# Patient Record
Sex: FEMALE | Race: WHITE | NOT HISPANIC OR LATINO | Employment: UNEMPLOYED | URBAN - METROPOLITAN AREA
[De-identification: names, ages, dates, MRNs, and addresses within clinical notes are randomized per-mention and may not be internally consistent; named-entity substitution may affect disease eponyms.]

---

## 2018-04-20 ENCOUNTER — TRANSCRIBE ORDERS (OUTPATIENT)
Dept: WOMENS IMAGING | Facility: HOSPITAL | Age: 28
End: 2018-04-20

## 2018-04-20 DIAGNOSIS — Z36.9 FIRST TRIMESTER SCREENING: Primary | ICD-10-CM

## 2018-04-20 DIAGNOSIS — IMO0002 NUCHAL FOLD THICKENING DETERMINED BY ULTRASOUND: ICD-10-CM

## 2018-05-03 ENCOUNTER — APPOINTMENT (OUTPATIENT)
Dept: WOMENS IMAGING | Facility: HOSPITAL | Age: 28
End: 2018-05-03

## 2018-05-09 ENCOUNTER — APPOINTMENT (OUTPATIENT)
Dept: WOMENS IMAGING | Facility: HOSPITAL | Age: 28
End: 2018-05-09

## 2018-06-08 ENCOUNTER — TRANSCRIBE ORDERS (OUTPATIENT)
Dept: WOMENS IMAGING | Facility: HOSPITAL | Age: 28
End: 2018-06-08

## 2018-06-08 DIAGNOSIS — O28.3 ABNORMAL FETAL ULTRASOUND: Primary | ICD-10-CM

## 2018-06-08 DIAGNOSIS — Z36.82 NUCHAL TRANSLUCENCY OF FETUS ON PRENATAL ULTRASOUND: ICD-10-CM

## 2018-06-18 ENCOUNTER — APPOINTMENT (OUTPATIENT)
Dept: WOMENS IMAGING | Facility: HOSPITAL | Age: 28
End: 2018-06-18

## 2020-12-08 ENCOUNTER — TELEPHONE (OUTPATIENT)
Dept: PSYCHIATRY | Facility: HOSPITAL | Age: 30
End: 2020-12-08

## 2022-03-11 ENCOUNTER — TRANSCRIBE ORDERS (OUTPATIENT)
Dept: ADMINISTRATIVE | Facility: HOSPITAL | Age: 32
End: 2022-03-11

## 2022-03-11 DIAGNOSIS — R60.9 PERIPHERAL EDEMA: Primary | ICD-10-CM

## 2023-03-17 LAB
EXTERNAL GROUP B STREP ANTIGEN: NORMAL
EXTERNAL HEPATITIS B SURFACE ANTIGEN: NEGATIVE
EXTERNAL RUBELLA QUALITATIVE: NORMAL

## 2023-03-31 ENCOUNTER — APPOINTMENT (OUTPATIENT)
Dept: GENERAL RADIOLOGY | Facility: HOSPITAL | Age: 33
End: 2023-03-31
Payer: COMMERCIAL

## 2023-03-31 ENCOUNTER — APPOINTMENT (OUTPATIENT)
Dept: ULTRASOUND IMAGING | Facility: HOSPITAL | Age: 33
End: 2023-03-31
Payer: COMMERCIAL

## 2023-03-31 ENCOUNTER — HOSPITAL ENCOUNTER (EMERGENCY)
Facility: HOSPITAL | Age: 33
Discharge: HOME | End: 2023-03-31
Attending: EMERGENCY MEDICINE | Admitting: EMERGENCY MEDICINE
Payer: COMMERCIAL

## 2023-03-31 VITALS
TEMPERATURE: 97.1 F | DIASTOLIC BLOOD PRESSURE: 75 MMHG | HEART RATE: 106 BPM | OXYGEN SATURATION: 98 % | SYSTOLIC BLOOD PRESSURE: 122 MMHG | RESPIRATION RATE: 18 BRPM | BODY MASS INDEX: 30.83 KG/M2 | HEIGHT: 63 IN | WEIGHT: 174 LBS

## 2023-03-31 DIAGNOSIS — S80.211A ABRASION OF RIGHT KNEE, INITIAL ENCOUNTER: ICD-10-CM

## 2023-03-31 DIAGNOSIS — S62.102A CLOSED FRACTURE OF LEFT WRIST, INITIAL ENCOUNTER: ICD-10-CM

## 2023-03-31 DIAGNOSIS — S80.212A ABRASION OF LEFT KNEE, INITIAL ENCOUNTER: ICD-10-CM

## 2023-03-31 DIAGNOSIS — S42.91XA CLOSED FRACTURE OF RIGHT SHOULDER, INITIAL ENCOUNTER: Primary | ICD-10-CM

## 2023-03-31 PROCEDURE — 76817 TRANSVAGINAL US OBSTETRIC: CPT

## 2023-03-31 PROCEDURE — 73030 X-RAY EXAM OF SHOULDER: CPT

## 2023-03-31 PROCEDURE — 73110 X-RAY EXAM OF WRIST: CPT

## 2023-03-31 PROCEDURE — 73110 X-RAY EXAM OF WRIST: CPT | Performed by: RADIOLOGY

## 2023-03-31 PROCEDURE — 73564 X-RAY EXAM KNEE 4 OR MORE: CPT | Performed by: RADIOLOGY

## 2023-03-31 PROCEDURE — 76817 TRANSVAGINAL US OBSTETRIC: CPT | Performed by: RADIOLOGY

## 2023-03-31 PROCEDURE — 73564 X-RAY EXAM KNEE 4 OR MORE: CPT

## 2023-03-31 PROCEDURE — 99282 EMERGENCY DEPT VISIT SF MDM: CPT

## 2023-03-31 PROCEDURE — 73030 X-RAY EXAM OF SHOULDER: CPT | Performed by: RADIOLOGY
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
## 2023-03-31 NOTE — ED NOTES
Pt arrived in cock-up wrist splint and shoulder sling that was placed by [facility] yesterday.

## 2023-04-03 ENCOUNTER — HOSPITAL ENCOUNTER (EMERGENCY)
Facility: HOSPITAL | Age: 33
Discharge: LEFT BEFORE DISCHARGE | End: 2023-04-03
Attending: STUDENT IN AN ORGANIZED HEALTH CARE EDUCATION/TRAINING PROGRAM | Admitting: STUDENT IN AN ORGANIZED HEALTH CARE EDUCATION/TRAINING PROGRAM
Payer: COMMERCIAL

## 2023-04-03 VITALS
SYSTOLIC BLOOD PRESSURE: 139 MMHG | BODY MASS INDEX: 30.83 KG/M2 | HEART RATE: 100 BPM | DIASTOLIC BLOOD PRESSURE: 85 MMHG | WEIGHT: 174 LBS | OXYGEN SATURATION: 94 % | RESPIRATION RATE: 18 BRPM | HEIGHT: 63 IN | TEMPERATURE: 99.1 F

## 2023-04-03 DIAGNOSIS — R07.9 CHEST PAIN, UNSPECIFIED TYPE: Primary | ICD-10-CM

## 2023-04-03 DIAGNOSIS — F19.10 SUBSTANCE ABUSE: ICD-10-CM

## 2023-04-03 LAB
ALBUMIN SERPL-MCNC: 3.3 G/DL (ref 3.5–5.2)
ALBUMIN/GLOB SERPL: 0.9 G/DL
ALP SERPL-CCNC: 54 U/L (ref 39–117)
ALT SERPL W P-5'-P-CCNC: 17 U/L (ref 1–33)
AMPHET+METHAMPHET UR QL: NEGATIVE
AMPHETAMINES UR QL: NEGATIVE
ANION GAP SERPL CALCULATED.3IONS-SCNC: 12.5 MMOL/L (ref 5–15)
AST SERPL-CCNC: 20 U/L (ref 1–32)
BACTERIA UR QL AUTO: ABNORMAL /HPF
BARBITURATES UR QL SCN: NEGATIVE
BASOPHILS # BLD AUTO: 0.03 10*3/MM3 (ref 0–0.2)
BASOPHILS NFR BLD AUTO: 0.3 % (ref 0–1.5)
BENZODIAZ UR QL SCN: NEGATIVE
BILIRUB SERPL-MCNC: 0.3 MG/DL (ref 0–1.2)
BILIRUB UR QL STRIP: NEGATIVE
BUN SERPL-MCNC: 6 MG/DL (ref 6–20)
BUN/CREAT SERPL: 10.3 (ref 7–25)
BUPRENORPHINE SERPL-MCNC: POSITIVE NG/ML
CALCIUM SPEC-SCNC: 9.4 MG/DL (ref 8.6–10.5)
CANNABINOIDS SERPL QL: POSITIVE
CHLORIDE SERPL-SCNC: 103 MMOL/L (ref 98–107)
CLARITY UR: CLEAR
CO2 SERPL-SCNC: 19.5 MMOL/L (ref 22–29)
COCAINE UR QL: NEGATIVE
COLOR UR: YELLOW
CREAT SERPL-MCNC: 0.58 MG/DL (ref 0.57–1)
CRP SERPL-MCNC: 2.69 MG/DL (ref 0–0.5)
DEPRECATED RDW RBC AUTO: 41.6 FL (ref 37–54)
EGFRCR SERPLBLD CKD-EPI 2021: 122 ML/MIN/1.73
EOSINOPHIL # BLD AUTO: 0.06 10*3/MM3 (ref 0–0.4)
EOSINOPHIL NFR BLD AUTO: 0.6 % (ref 0.3–6.2)
ERYTHROCYTE [DISTWIDTH] IN BLOOD BY AUTOMATED COUNT: 12.6 % (ref 12.3–15.4)
GLOBULIN UR ELPH-MCNC: 3.7 GM/DL
GLUCOSE SERPL-MCNC: 123 MG/DL (ref 65–99)
GLUCOSE UR STRIP-MCNC: NEGATIVE MG/DL
HCG INTACT+B SERPL-ACNC: NORMAL MIU/ML
HCT VFR BLD AUTO: 34.1 % (ref 34–46.6)
HGB BLD-MCNC: 11.6 G/DL (ref 12–15.9)
HGB UR QL STRIP.AUTO: NEGATIVE
HYALINE CASTS UR QL AUTO: ABNORMAL /LPF
IMM GRANULOCYTES # BLD AUTO: 0.03 10*3/MM3 (ref 0–0.05)
IMM GRANULOCYTES NFR BLD AUTO: 0.3 % (ref 0–0.5)
KETONES UR QL STRIP: ABNORMAL
LEUKOCYTE ESTERASE UR QL STRIP.AUTO: NEGATIVE
LYMPHOCYTES # BLD AUTO: 1.96 10*3/MM3 (ref 0.7–3.1)
LYMPHOCYTES NFR BLD AUTO: 20.7 % (ref 19.6–45.3)
MAGNESIUM SERPL-MCNC: 1.5 MG/DL (ref 1.6–2.6)
MCH RBC QN AUTO: 30.4 PG (ref 26.6–33)
MCHC RBC AUTO-ENTMCNC: 34 G/DL (ref 31.5–35.7)
MCV RBC AUTO: 89.5 FL (ref 79–97)
METHADONE UR QL SCN: NEGATIVE
MONOCYTES # BLD AUTO: 0.51 10*3/MM3 (ref 0.1–0.9)
MONOCYTES NFR BLD AUTO: 5.4 % (ref 5–12)
NEUTROPHILS NFR BLD AUTO: 6.87 10*3/MM3 (ref 1.7–7)
NEUTROPHILS NFR BLD AUTO: 72.7 % (ref 42.7–76)
NITRITE UR QL STRIP: NEGATIVE
NRBC BLD AUTO-RTO: 0 /100 WBC (ref 0–0.2)
OPIATES UR QL: POSITIVE
OXYCODONE UR QL SCN: POSITIVE
PCP UR QL SCN: NEGATIVE
PH UR STRIP.AUTO: 8 [PH] (ref 5–8)
PLATELET # BLD AUTO: 175 10*3/MM3 (ref 140–450)
PMV BLD AUTO: 11.6 FL (ref 6–12)
POTASSIUM SERPL-SCNC: 3.8 MMOL/L (ref 3.5–5.2)
PROPOXYPH UR QL: NEGATIVE
PROT SERPL-MCNC: 7 G/DL (ref 6–8.5)
PROT UR QL STRIP: ABNORMAL
RBC # BLD AUTO: 3.81 10*6/MM3 (ref 3.77–5.28)
RBC # UR STRIP: ABNORMAL /HPF
REF LAB TEST METHOD: ABNORMAL
SODIUM SERPL-SCNC: 135 MMOL/L (ref 136–145)
SP GR UR STRIP: 1.01 (ref 1–1.03)
SQUAMOUS #/AREA URNS HPF: ABNORMAL /HPF
TRICYCLICS UR QL SCN: NEGATIVE
TROPONIN T SERPL HS-MCNC: <6 NG/L
UROBILINOGEN UR QL STRIP: ABNORMAL
WBC # UR STRIP: ABNORMAL /HPF
WBC NRBC COR # BLD: 9.46 10*3/MM3 (ref 3.4–10.8)

## 2023-04-03 PROCEDURE — 80053 COMPREHEN METABOLIC PANEL: CPT | Performed by: PHYSICIAN ASSISTANT

## 2023-04-03 PROCEDURE — 86140 C-REACTIVE PROTEIN: CPT | Performed by: PHYSICIAN ASSISTANT

## 2023-04-03 PROCEDURE — 84484 ASSAY OF TROPONIN QUANT: CPT | Performed by: PHYSICIAN ASSISTANT

## 2023-04-03 PROCEDURE — 83735 ASSAY OF MAGNESIUM: CPT | Performed by: PHYSICIAN ASSISTANT

## 2023-04-03 PROCEDURE — 81001 URINALYSIS AUTO W/SCOPE: CPT | Performed by: PHYSICIAN ASSISTANT

## 2023-04-03 PROCEDURE — 93010 ELECTROCARDIOGRAM REPORT: CPT | Performed by: SPECIALIST

## 2023-04-03 PROCEDURE — 36415 COLL VENOUS BLD VENIPUNCTURE: CPT

## 2023-04-03 PROCEDURE — 84702 CHORIONIC GONADOTROPIN TEST: CPT | Performed by: PHYSICIAN ASSISTANT

## 2023-04-03 PROCEDURE — 80306 DRUG TEST PRSMV INSTRMNT: CPT | Performed by: PHYSICIAN ASSISTANT

## 2023-04-03 PROCEDURE — 85025 COMPLETE CBC W/AUTO DIFF WBC: CPT | Performed by: PHYSICIAN ASSISTANT

## 2023-04-03 PROCEDURE — 63710000001 ONDANSETRON ODT 4 MG TABLET DISPERSIBLE: Performed by: PHYSICIAN ASSISTANT

## 2023-04-03 PROCEDURE — 93005 ELECTROCARDIOGRAM TRACING: CPT | Performed by: STUDENT IN AN ORGANIZED HEALTH CARE EDUCATION/TRAINING PROGRAM

## 2023-04-03 PROCEDURE — 99284 EMERGENCY DEPT VISIT MOD MDM: CPT

## 2023-04-03 RX ORDER — ACETAMINOPHEN 500 MG
1000 TABLET ORAL ONCE
Status: COMPLETED | OUTPATIENT
Start: 2023-04-03 | End: 2023-04-03

## 2023-04-03 RX ORDER — SODIUM CHLORIDE 0.9 % (FLUSH) 0.9 %
10 SYRINGE (ML) INJECTION AS NEEDED
Status: DISCONTINUED | OUTPATIENT
Start: 2023-04-03 | End: 2023-04-03 | Stop reason: HOSPADM

## 2023-04-03 RX ORDER — ONDANSETRON 4 MG/1
4 TABLET, ORALLY DISINTEGRATING ORAL ONCE
Status: COMPLETED | OUTPATIENT
Start: 2023-04-03 | End: 2023-04-03

## 2023-04-03 RX ORDER — ONDANSETRON 2 MG/ML
4 INJECTION INTRAMUSCULAR; INTRAVENOUS ONCE
Status: DISCONTINUED | OUTPATIENT
Start: 2023-04-03 | End: 2023-04-03 | Stop reason: HOSPADM

## 2023-04-03 RX ADMIN — ACETAMINOPHEN 1000 MG: 500 TABLET ORAL at 07:34

## 2023-04-03 RX ADMIN — ONDANSETRON 4 MG: 4 TABLET, ORALLY DISINTEGRATING ORAL at 07:25

## 2023-04-03 RX ADMIN — MAGNESIUM GLUCONATE 500 MG ORAL TABLET 400 MG: 500 TABLET ORAL at 08:40

## 2023-04-03 NOTE — ED NOTES
Went to do hourly rounding on patient, she is not in her room. Checked both bathrooms and surrounding areas, unable to locate patient. It appears that patient has eloped. Patient did not have an IV.

## 2023-04-03 NOTE — ED PROVIDER NOTES
Subjective     History provided by:  Patient  Chest Pain  Pain location:  Substernal area and epigastric  Pain quality: aching    Pain severity:  Moderate  Onset quality:  Sudden  Duration:  1 day  Timing:  Constant  Progression:  Worsening  Chronicity:  New  Relieved by:  Nothing  Associated symptoms: abdominal pain    Associated symptoms: no fever        Review of Systems   Constitutional: Negative.  Negative for fever.   HENT: Negative.    Respiratory: Negative.    Cardiovascular: Positive for chest pain.   Gastrointestinal: Positive for abdominal pain.   Endocrine: Negative.    Genitourinary: Negative.  Negative for dysuria.   Skin: Negative.    Neurological: Negative.    Psychiatric/Behavioral: Negative.    All other systems reviewed and are negative.      No past medical history on file.    Allergies   Allergen Reactions   • Codeine Unknown - Low Severity   • Penicillins Swelling       No past surgical history on file.    No family history on file.    Social History     Socioeconomic History   • Marital status:            Objective   Physical Exam  Vitals and nursing note reviewed.   Constitutional:       General: She is not in acute distress.     Appearance: She is well-developed. She is not diaphoretic.   HENT:      Head: Normocephalic and atraumatic.      Right Ear: External ear normal.      Left Ear: External ear normal.      Nose: Nose normal.   Eyes:      Conjunctiva/sclera: Conjunctivae normal.      Pupils: Pupils are equal, round, and reactive to light.   Neck:      Vascular: No JVD.      Trachea: No tracheal deviation.   Cardiovascular:      Rate and Rhythm: Normal rate and regular rhythm.      Heart sounds: Normal heart sounds. No murmur heard.  Pulmonary:      Effort: Pulmonary effort is normal. No respiratory distress.      Breath sounds: No wheezing.   Abdominal:      Palpations: Abdomen is soft.      Tenderness: There is abdominal tenderness (epigastric).   Musculoskeletal:          General: No deformity. Normal range of motion.      Cervical back: Normal range of motion and neck supple.   Skin:     General: Skin is warm and dry.      Coloration: Skin is not pale.      Findings: No erythema or rash.   Neurological:      Mental Status: She is alert and oriented to person, place, and time.      Cranial Nerves: No cranial nerve deficit.   Psychiatric:         Behavior: Behavior normal.         Thought Content: Thought content normal.         Procedures           ED Course  ED Course as of 04/03/23 1014   Mon Apr 03, 2023   0656 ECG sinus tachycardia 101 HR. . . ST abnormality in V2 and V3, likely artifact. No ST elevation, reciprocal changes. NO STEMI criteria. Electronically signed by Vaishnavi Cheng MD, 04/03/23, 6:57 AM EDT.  [LS]   0915 Patient eloped from the ED.  She did not notify staff that she was leaving. [MB]      ED Course User Index  [LS] Vaishnavi Cheng MD  [MB] Taylor Be APRN                                           [facility]    Final diagnoses:   Chest pain, unspecified type   Substance abuse       ED Disposition  ED Disposition     ED Disposition   Eloped    Condition   --    Comment   --             No follow-up provider specified.       Medication List      No changes were made to your prescriptions during this visit.          Taylor Be APRN  04/03/23 1014

## 2023-04-03 NOTE — ED NOTES
Went to obtain IV access on patient, she reports that she is a former IV drug user and has not veins. Requesting to have medication changed to PO, provider aware.

## 2023-04-05 LAB
QT INTERVAL: 322 MS
QTC INTERVAL: 417 MS

## 2023-04-11 LAB
EXTERNAL SYPHILIS RPR SCREEN: NORMAL
HIV1 P24 AG SERPL QL IA: NORMAL

## 2023-08-02 ENCOUNTER — PATIENT OUTREACH (OUTPATIENT)
Dept: LABOR AND DELIVERY | Facility: HOSPITAL | Age: 33
End: 2023-08-02
Payer: COMMERCIAL

## 2023-08-02 ENCOUNTER — PATIENT MESSAGE (OUTPATIENT)
Dept: LABOR AND DELIVERY | Facility: HOSPITAL | Age: 33
End: 2023-08-02
Payer: COMMERCIAL

## 2023-09-07 ENCOUNTER — PATIENT MESSAGE (OUTPATIENT)
Dept: LABOR AND DELIVERY | Facility: HOSPITAL | Age: 33
End: 2023-09-07
Payer: COMMERCIAL

## 2023-09-07 ENCOUNTER — PATIENT OUTREACH (OUTPATIENT)
Dept: LABOR AND DELIVERY | Facility: HOSPITAL | Age: 33
End: 2023-09-07
Payer: COMMERCIAL

## 2023-09-07 NOTE — OUTREACH NOTE
[facility]  Check-In    Current Estimated Gestational Age: 33w2d      Questions/Answers      Flowsheet Row Responses   Best Method for Contacting Home   Demographics Reviewed Yes   Currently Employed No   Able to keep appointments as scheduled Yes   Baby Active/Feeling Fetal Movemen Yes   Questions regarding prenatal visits or tests to be ordered? No   Education related to new diagnoses/home equipment No   May I ask you questions about your substance use? Yes   Other Comment Pt is in MAT program at [facility].   Special Considerations Birthing Ball   Supplies ready for baby Breast Pump, Car Seat, Clothing, Crib, Diapers, Feeding Supplies   Resource/Environmental Concerns None   Do you have any questions related to your care experience, your pregnancy, plans for delivery, any concerns, etc? No          Pt returned earlier call. Pt reports her pregnancy is going well. She denies any needs or concerns at this time.    Referral submitted to the following resources (verbal consent received to submit demographic information):         Elvira Sen RN  Maternity Nurse Navigator    9/7/2023, 16:08 EDT

## 2023-09-07 NOTE — OUTREACH NOTE
[facility]  Unable to Reach       Questions/Answers      Flowsheet Row Responses   Pending Outreach Prenatal Check-in   Call Attempt Second   Outcome Left message            Left message with call back number. Information and Depression questionnaire sent by Sharda.      Elvira Sen RN  Maternity Nurse Navigator    9/7/2023, 14:53 EDT

## 2023-09-27 ENCOUNTER — ANESTHESIA (OUTPATIENT)
Dept: LABOR AND DELIVERY | Facility: HOSPITAL | Age: 33
End: 2023-09-27
Payer: COMMERCIAL

## 2023-09-27 ENCOUNTER — ANESTHESIA EVENT (OUTPATIENT)
Dept: LABOR AND DELIVERY | Facility: HOSPITAL | Age: 33
End: 2023-09-27
Payer: COMMERCIAL

## 2023-09-27 ENCOUNTER — HOSPITAL ENCOUNTER (INPATIENT)
Facility: HOSPITAL | Age: 33
LOS: 2 days | Discharge: HOME | End: 2023-09-29
Attending: OBSTETRICS & GYNECOLOGY | Admitting: OBSTETRICS & GYNECOLOGY
Payer: COMMERCIAL

## 2023-09-27 PROBLEM — Z34.90 PREGNANCY: Status: ACTIVE | Noted: 2023-09-27

## 2023-09-27 LAB
A1 MICROGLOB PLACENTAL VAG QL: POSITIVE
ABO GROUP BLD: NORMAL
ABO GROUP BLD: NORMAL
ALBUMIN SERPL-MCNC: 2.8 G/DL (ref 3.5–5.2)
ALBUMIN/GLOB SERPL: 0.8 G/DL
ALP SERPL-CCNC: 104 U/L (ref 39–117)
ALT SERPL W P-5'-P-CCNC: 10 U/L (ref 1–33)
AMPHET+METHAMPHET UR QL: NEGATIVE
AMPHETAMINES UR QL: NEGATIVE
ANION GAP SERPL CALCULATED.3IONS-SCNC: 8.6 MMOL/L (ref 5–15)
AST SERPL-CCNC: 15 U/L (ref 1–32)
BARBITURATES UR QL SCN: NEGATIVE
BENZODIAZ UR QL SCN: NEGATIVE
BILIRUB SERPL-MCNC: 0.2 MG/DL (ref 0–1.2)
BLD GP AB SCN SERPL QL: NEGATIVE
BUN SERPL-MCNC: 4 MG/DL (ref 6–20)
BUN/CREAT SERPL: 6.3 (ref 7–25)
BUPRENORPHINE SERPL-MCNC: POSITIVE NG/ML
CALCIUM SPEC-SCNC: 9.1 MG/DL (ref 8.6–10.5)
CANNABINOIDS SERPL QL: NEGATIVE
CHLORIDE SERPL-SCNC: 104 MMOL/L (ref 98–107)
CO2 SERPL-SCNC: 23.4 MMOL/L (ref 22–29)
COCAINE UR QL: NEGATIVE
CREAT SERPL-MCNC: 0.64 MG/DL (ref 0.57–1)
DEPRECATED RDW RBC AUTO: 47.1 FL (ref 37–54)
EGFRCR SERPLBLD CKD-EPI 2021: 119.1 ML/MIN/1.73
ERYTHROCYTE [DISTWIDTH] IN BLOOD BY AUTOMATED COUNT: 14 % (ref 12.3–15.4)
FENTANYL UR-MCNC: NEGATIVE NG/ML
GLOBULIN UR ELPH-MCNC: 3.5 GM/DL
GLUCOSE BLDC GLUCOMTR-MCNC: 211 MG/DL (ref 70–130)
GLUCOSE SERPL-MCNC: 117 MG/DL (ref 65–99)
HCT VFR BLD AUTO: 33.4 % (ref 34–46.6)
HGB BLD-MCNC: 11.1 G/DL (ref 12–15.9)
MCH RBC QN AUTO: 31.1 PG (ref 26.6–33)
MCHC RBC AUTO-ENTMCNC: 33.2 G/DL (ref 31.5–35.7)
MCV RBC AUTO: 93.6 FL (ref 79–97)
METHADONE UR QL SCN: NEGATIVE
OPIATES UR QL: NEGATIVE
OXYCODONE UR QL SCN: NEGATIVE
PCP UR QL SCN: NEGATIVE
PLATELET # BLD AUTO: 189 10*3/MM3 (ref 140–450)
PMV BLD AUTO: 10.2 FL (ref 6–12)
POTASSIUM SERPL-SCNC: 3.9 MMOL/L (ref 3.5–5.2)
PROPOXYPH UR QL: NEGATIVE
PROT SERPL-MCNC: 6.3 G/DL (ref 6–8.5)
RBC # BLD AUTO: 3.57 10*6/MM3 (ref 3.77–5.28)
RH BLD: POSITIVE
RH BLD: POSITIVE
SODIUM SERPL-SCNC: 136 MMOL/L (ref 136–145)
T&S EXPIRATION DATE: NORMAL
TRICYCLICS UR QL SCN: NEGATIVE
WBC NRBC COR # BLD: 12.89 10*3/MM3 (ref 3.4–10.8)

## 2023-09-27 PROCEDURE — 25010000002 BETAMETHASONE ACET & SOD PHOS PER 4 MG: Performed by: OBSTETRICS & GYNECOLOGY

## 2023-09-27 PROCEDURE — 25010000002 AZITHROMYCIN PER 500 MG: Performed by: OBSTETRICS & GYNECOLOGY

## 2023-09-27 PROCEDURE — 86850 RBC ANTIBODY SCREEN: CPT | Performed by: OBSTETRICS & GYNECOLOGY

## 2023-09-27 PROCEDURE — 25010000002 AMPICILLIN PER 500 MG: Performed by: OBSTETRICS & GYNECOLOGY

## 2023-09-27 PROCEDURE — 86900 BLOOD TYPING SEROLOGIC ABO: CPT | Performed by: OBSTETRICS & GYNECOLOGY

## 2023-09-27 PROCEDURE — 86901 BLOOD TYPING SEROLOGIC RH(D): CPT | Performed by: OBSTETRICS & GYNECOLOGY

## 2023-09-27 PROCEDURE — 85027 COMPLETE CBC AUTOMATED: CPT | Performed by: OBSTETRICS & GYNECOLOGY

## 2023-09-27 PROCEDURE — 80307 DRUG TEST PRSMV CHEM ANLYZR: CPT | Performed by: OBSTETRICS & GYNECOLOGY

## 2023-09-27 PROCEDURE — C1755 CATHETER, INTRASPINAL: HCPCS | Performed by: NURSE ANESTHETIST, CERTIFIED REGISTERED

## 2023-09-27 PROCEDURE — 36415 COLL VENOUS BLD VENIPUNCTURE: CPT | Performed by: OBSTETRICS & GYNECOLOGY

## 2023-09-27 PROCEDURE — 84112 EVAL AMNIOTIC FLUID PROTEIN: CPT | Performed by: OBSTETRICS & GYNECOLOGY

## 2023-09-27 PROCEDURE — 80053 COMPREHEN METABOLIC PANEL: CPT | Performed by: OBSTETRICS & GYNECOLOGY

## 2023-09-27 PROCEDURE — 82948 REAGENT STRIP/BLOOD GLUCOSE: CPT

## 2023-09-27 PROCEDURE — 86900 BLOOD TYPING SEROLOGIC ABO: CPT

## 2023-09-27 PROCEDURE — 25010000002 FENTANYL CITRATE (PF) 50 MCG/ML SOLUTION: Performed by: NURSE ANESTHETIST, CERTIFIED REGISTERED

## 2023-09-27 PROCEDURE — 86901 BLOOD TYPING SEROLOGIC RH(D): CPT

## 2023-09-27 PROCEDURE — 0 LIDOCAINE 1 % SOLUTION: Performed by: NURSE ANESTHETIST, CERTIFIED REGISTERED

## 2023-09-27 RX ORDER — OXYTOCIN/0.9 % SODIUM CHLORIDE 30/500 ML
PLASTIC BAG, INJECTION (ML) INTRAVENOUS
Status: COMPLETED
Start: 2023-09-27 | End: 2023-09-27

## 2023-09-27 RX ORDER — ACETAMINOPHEN 325 MG/1
650 TABLET ORAL EVERY 4 HOURS PRN
Status: DISCONTINUED | OUTPATIENT
Start: 2023-09-27 | End: 2023-09-28

## 2023-09-27 RX ORDER — PRENATAL VIT/IRON FUM/FOLIC AC 27MG-0.8MG
1 TABLET ORAL DAILY
Status: DISCONTINUED | OUTPATIENT
Start: 2023-09-28 | End: 2023-09-29 | Stop reason: HOSPADM

## 2023-09-27 RX ORDER — SODIUM CHLORIDE, SODIUM LACTATE, POTASSIUM CHLORIDE, CALCIUM CHLORIDE 600; 310; 30; 20 MG/100ML; MG/100ML; MG/100ML; MG/100ML
125 INJECTION, SOLUTION INTRAVENOUS CONTINUOUS
Status: DISCONTINUED | OUTPATIENT
Start: 2023-09-27 | End: 2023-09-29 | Stop reason: HOSPADM

## 2023-09-27 RX ORDER — OXYTOCIN/0.9 % SODIUM CHLORIDE 30/500 ML
250 PLASTIC BAG, INJECTION (ML) INTRAVENOUS CONTINUOUS
Status: ACTIVE | OUTPATIENT
Start: 2023-09-27 | End: 2023-09-27

## 2023-09-27 RX ORDER — LIDOCAINE HYDROCHLORIDE 10 MG/ML
0.5 INJECTION, SOLUTION INFILTRATION; PERINEURAL ONCE AS NEEDED
Status: DISCONTINUED | OUTPATIENT
Start: 2023-09-27 | End: 2023-09-28

## 2023-09-27 RX ORDER — ONDANSETRON 2 MG/ML
4 INJECTION INTRAMUSCULAR; INTRAVENOUS EVERY 6 HOURS PRN
Status: DISCONTINUED | OUTPATIENT
Start: 2023-09-27 | End: 2023-09-28

## 2023-09-27 RX ORDER — METOCLOPRAMIDE 10 MG/1
10 TABLET ORAL ONCE
Status: COMPLETED | OUTPATIENT
Start: 2023-09-28 | End: 2023-09-28

## 2023-09-27 RX ORDER — DIPHENHYDRAMINE HCL 25 MG
25 CAPSULE ORAL NIGHTLY PRN
Status: DISCONTINUED | OUTPATIENT
Start: 2023-09-27 | End: 2023-09-29 | Stop reason: HOSPADM

## 2023-09-27 RX ORDER — HYDROCORTISONE 25 MG/G
1 CREAM TOPICAL AS NEEDED
Status: DISCONTINUED | OUTPATIENT
Start: 2023-09-27 | End: 2023-09-29 | Stop reason: HOSPADM

## 2023-09-27 RX ORDER — ACETAMINOPHEN 325 MG/1
650 TABLET ORAL EVERY 6 HOURS PRN
Status: DISCONTINUED | OUTPATIENT
Start: 2023-09-27 | End: 2023-09-29 | Stop reason: HOSPADM

## 2023-09-27 RX ORDER — ONDANSETRON 2 MG/ML
4 INJECTION INTRAMUSCULAR; INTRAVENOUS EVERY 6 HOURS PRN
Status: DISCONTINUED | OUTPATIENT
Start: 2023-09-27 | End: 2023-09-29 | Stop reason: HOSPADM

## 2023-09-27 RX ORDER — ONDANSETRON 4 MG/1
4 TABLET, FILM COATED ORAL EVERY 6 HOURS PRN
Status: DISCONTINUED | OUTPATIENT
Start: 2023-09-27 | End: 2023-09-28

## 2023-09-27 RX ORDER — BUPRENORPHINE HYDROCHLORIDE AND NALOXONE HYDROCHLORIDE DIHYDRATE 8; 2 MG/1; MG/1
1 TABLET SUBLINGUAL DAILY
Status: ON HOLD | COMMUNITY
End: 2023-09-28

## 2023-09-27 RX ORDER — EPHEDRINE SULFATE 5 MG/ML
10 INJECTION INTRAVENOUS
Status: DISCONTINUED | OUTPATIENT
Start: 2023-09-27 | End: 2023-09-28

## 2023-09-27 RX ORDER — BETAMETHASONE SODIUM PHOSPHATE AND BETAMETHASONE ACETATE 3; 3 MG/ML; MG/ML
12 INJECTION, SUSPENSION INTRA-ARTICULAR; INTRALESIONAL; INTRAMUSCULAR; SOFT TISSUE ONCE
Status: COMPLETED | OUTPATIENT
Start: 2023-09-27 | End: 2023-09-27

## 2023-09-27 RX ORDER — ROPIVACAINE HYDROCHLORIDE 2 MG/ML
14 INJECTION, SOLUTION EPIDURAL; INFILTRATION; PERINEURAL CONTINUOUS
Status: DISCONTINUED | OUTPATIENT
Start: 2023-09-27 | End: 2023-09-29 | Stop reason: HOSPADM

## 2023-09-27 RX ORDER — SODIUM CHLORIDE 0.9 % (FLUSH) 0.9 %
10 SYRINGE (ML) INJECTION EVERY 12 HOURS SCHEDULED
Status: DISCONTINUED | OUTPATIENT
Start: 2023-09-27 | End: 2023-09-28

## 2023-09-27 RX ORDER — OXYTOCIN/0.9 % SODIUM CHLORIDE 30/500 ML
125 PLASTIC BAG, INJECTION (ML) INTRAVENOUS CONTINUOUS PRN
Status: DISCONTINUED | OUTPATIENT
Start: 2023-09-27 | End: 2023-09-29 | Stop reason: HOSPADM

## 2023-09-27 RX ORDER — ONDANSETRON 4 MG/1
4 TABLET, FILM COATED ORAL EVERY 6 HOURS PRN
Status: DISCONTINUED | OUTPATIENT
Start: 2023-09-27 | End: 2023-09-29 | Stop reason: HOSPADM

## 2023-09-27 RX ORDER — BISACODYL 10 MG
10 SUPPOSITORY, RECTAL RECTAL DAILY PRN
Status: DISCONTINUED | OUTPATIENT
Start: 2023-09-28 | End: 2023-09-29 | Stop reason: HOSPADM

## 2023-09-27 RX ORDER — MAGNESIUM HYDROXIDE 1200 MG/15ML
1000 LIQUID ORAL ONCE AS NEEDED
Status: DISCONTINUED | OUTPATIENT
Start: 2023-09-27 | End: 2023-09-28

## 2023-09-27 RX ORDER — SODIUM CHLORIDE 9 MG/ML
40 INJECTION, SOLUTION INTRAVENOUS AS NEEDED
Status: DISCONTINUED | OUTPATIENT
Start: 2023-09-27 | End: 2023-09-28

## 2023-09-27 RX ORDER — IBUPROFEN 600 MG/1
600 TABLET ORAL EVERY 6 HOURS PRN
Status: DISCONTINUED | OUTPATIENT
Start: 2023-09-27 | End: 2023-09-29 | Stop reason: HOSPADM

## 2023-09-27 RX ORDER — OXYTOCIN/0.9 % SODIUM CHLORIDE 30/500 ML
999 PLASTIC BAG, INJECTION (ML) INTRAVENOUS ONCE
Status: COMPLETED | OUTPATIENT
Start: 2023-09-27 | End: 2023-09-27

## 2023-09-27 RX ORDER — MISOPROSTOL 100 UG/1
600 TABLET ORAL AS NEEDED
Status: DISCONTINUED | OUTPATIENT
Start: 2023-09-27 | End: 2023-09-28

## 2023-09-27 RX ORDER — SODIUM CHLORIDE 0.9 % (FLUSH) 0.9 %
10 SYRINGE (ML) INJECTION AS NEEDED
Status: DISCONTINUED | OUTPATIENT
Start: 2023-09-27 | End: 2023-09-28

## 2023-09-27 RX ORDER — MISOPROSTOL 100 UG/1
600 TABLET ORAL ONCE AS NEEDED
Status: DISCONTINUED | OUTPATIENT
Start: 2023-09-27 | End: 2023-09-29 | Stop reason: HOSPADM

## 2023-09-27 RX ORDER — DOCUSATE SODIUM 100 MG/1
100 CAPSULE, LIQUID FILLED ORAL 2 TIMES DAILY
Status: DISCONTINUED | OUTPATIENT
Start: 2023-09-28 | End: 2023-09-29 | Stop reason: HOSPADM

## 2023-09-27 RX ORDER — METHYLERGONOVINE MALEATE 0.2 MG/ML
200 INJECTION INTRAVENOUS ONCE AS NEEDED
Status: DISCONTINUED | OUTPATIENT
Start: 2023-09-27 | End: 2023-09-29 | Stop reason: HOSPADM

## 2023-09-27 RX ORDER — FENTANYL CIT 0.2 MG/100ML-ROPIV 0.2%-NACL 0.9% EPIDURAL INJ 2/0.2 MCG/ML-%
SOLUTION INJECTION CONTINUOUS PRN
Status: DISCONTINUED | OUTPATIENT
Start: 2023-09-27 | End: 2023-09-27 | Stop reason: SURG

## 2023-09-27 RX ORDER — IBUPROFEN 800 MG/1
800 TABLET ORAL ONCE AS NEEDED
Status: DISCONTINUED | OUTPATIENT
Start: 2023-09-27 | End: 2023-09-28

## 2023-09-27 RX ORDER — FAMOTIDINE 10 MG/ML
20 INJECTION, SOLUTION INTRAVENOUS ONCE AS NEEDED
Status: DISCONTINUED | OUTPATIENT
Start: 2023-09-27 | End: 2023-09-28

## 2023-09-27 RX ORDER — CARBOPROST TROMETHAMINE 250 UG/ML
250 INJECTION, SOLUTION INTRAMUSCULAR AS NEEDED
Status: DISCONTINUED | OUTPATIENT
Start: 2023-09-27 | End: 2023-09-28

## 2023-09-27 RX ORDER — BUPRENORPHINE HYDROCHLORIDE AND NALOXONE HYDROCHLORIDE DIHYDRATE 8; 2 MG/1; MG/1
1 TABLET SUBLINGUAL DAILY
Status: DISCONTINUED | OUTPATIENT
Start: 2023-09-28 | End: 2023-09-28 | Stop reason: ALTCHOICE

## 2023-09-27 RX ORDER — FENTANYL CITRATE 50 UG/ML
INJECTION, SOLUTION INTRAMUSCULAR; INTRAVENOUS AS NEEDED
Status: DISCONTINUED | OUTPATIENT
Start: 2023-09-27 | End: 2023-09-27 | Stop reason: SURG

## 2023-09-27 RX ORDER — LIDOCAINE HYDROCHLORIDE 10 MG/ML
INJECTION, SOLUTION INFILTRATION; PERINEURAL AS NEEDED
Status: DISCONTINUED | OUTPATIENT
Start: 2023-09-27 | End: 2023-09-27 | Stop reason: SURG

## 2023-09-27 RX ORDER — SODIUM CHLORIDE 0.9 % (FLUSH) 0.9 %
1-10 SYRINGE (ML) INJECTION AS NEEDED
Status: DISCONTINUED | OUTPATIENT
Start: 2023-09-27 | End: 2023-09-29 | Stop reason: HOSPADM

## 2023-09-27 RX ORDER — CARBOPROST TROMETHAMINE 250 UG/ML
250 INJECTION, SOLUTION INTRAMUSCULAR ONCE AS NEEDED
Status: DISCONTINUED | OUTPATIENT
Start: 2023-09-27 | End: 2023-09-29 | Stop reason: HOSPADM

## 2023-09-27 RX ORDER — METHYLERGONOVINE MALEATE 0.2 MG/ML
200 INJECTION INTRAVENOUS ONCE AS NEEDED
Status: DISCONTINUED | OUTPATIENT
Start: 2023-09-27 | End: 2023-09-28

## 2023-09-27 RX ORDER — ONDANSETRON 2 MG/ML
4 INJECTION INTRAMUSCULAR; INTRAVENOUS ONCE AS NEEDED
Status: DISCONTINUED | OUTPATIENT
Start: 2023-09-27 | End: 2023-09-28

## 2023-09-27 RX ADMIN — Medication 250 ML/HR: at 22:45

## 2023-09-27 RX ADMIN — FENTANYL CITRATE 100 MCG: 50 INJECTION INTRAMUSCULAR; INTRAVENOUS at 21:11

## 2023-09-27 RX ADMIN — BETAMETHASONE SODIUM PHOSPHATE AND BETAMETHASONE ACETATE 12 MG: 3; 3 INJECTION, SUSPENSION INTRA-ARTICULAR; INTRALESIONAL; INTRAMUSCULAR at 20:24

## 2023-09-27 RX ADMIN — Medication 14 ML/HR: at 21:11

## 2023-09-27 RX ADMIN — Medication 999 ML/HR: at 22:23

## 2023-09-27 RX ADMIN — AMPICILLIN SODIUM 2000 MG: 2 INJECTION, POWDER, FOR SOLUTION INTRAMUSCULAR; INTRAVENOUS at 20:29

## 2023-09-27 RX ADMIN — SODIUM CHLORIDE, POTASSIUM CHLORIDE, SODIUM LACTATE AND CALCIUM CHLORIDE 125 ML/HR: 600; 310; 30; 20 INJECTION, SOLUTION INTRAVENOUS at 20:29

## 2023-09-27 RX ADMIN — LIDOCAINE HYDROCHLORIDE 3 ML: 10 INJECTION, SOLUTION INFILTRATION; PERINEURAL at 21:09

## 2023-09-27 RX ADMIN — MISOPROSTOL 600 MCG: 100 TABLET ORAL at 23:02

## 2023-09-27 RX ADMIN — SODIUM CHLORIDE, POTASSIUM CHLORIDE, SODIUM LACTATE AND CALCIUM CHLORIDE 1000 ML: 600; 310; 30; 20 INJECTION, SOLUTION INTRAVENOUS at 22:00

## 2023-09-27 RX ADMIN — Medication 10 ML: at 20:29

## 2023-09-27 RX ADMIN — AZITHROMYCIN MONOHYDRATE 500 MG: 500 INJECTION, POWDER, LYOPHILIZED, FOR SOLUTION INTRAVENOUS at 20:53

## 2023-09-28 ENCOUNTER — PATIENT OUTREACH (OUTPATIENT)
Dept: LABOR AND DELIVERY | Facility: HOSPITAL | Age: 33
End: 2023-09-28
Payer: COMMERCIAL

## 2023-09-28 LAB
BASOPHILS # BLD AUTO: 0.03 10*3/MM3 (ref 0–0.2)
BASOPHILS NFR BLD AUTO: 0.2 % (ref 0–1.5)
DEPRECATED RDW RBC AUTO: 45.6 FL (ref 37–54)
EOSINOPHIL # BLD AUTO: 0 10*3/MM3 (ref 0–0.4)
EOSINOPHIL NFR BLD AUTO: 0 % (ref 0.3–6.2)
ERYTHROCYTE [DISTWIDTH] IN BLOOD BY AUTOMATED COUNT: 13.6 % (ref 12.3–15.4)
GLUCOSE BLDC GLUCOMTR-MCNC: 170 MG/DL (ref 70–130)
GLUCOSE BLDC GLUCOMTR-MCNC: 172 MG/DL (ref 70–130)
GLUCOSE BLDC GLUCOMTR-MCNC: 197 MG/DL (ref 70–130)
GLUCOSE BLDC GLUCOMTR-MCNC: 214 MG/DL (ref 70–130)
HCT VFR BLD AUTO: 34.6 % (ref 34–46.6)
HGB BLD-MCNC: 11.4 G/DL (ref 12–15.9)
IMM GRANULOCYTES # BLD AUTO: 0.18 10*3/MM3 (ref 0–0.05)
IMM GRANULOCYTES NFR BLD AUTO: 1 % (ref 0–0.5)
LYMPHOCYTES # BLD AUTO: 2.13 10*3/MM3 (ref 0.7–3.1)
LYMPHOCYTES NFR BLD AUTO: 11.6 % (ref 19.6–45.3)
MCH RBC QN AUTO: 30.5 PG (ref 26.6–33)
MCHC RBC AUTO-ENTMCNC: 32.9 G/DL (ref 31.5–35.7)
MCV RBC AUTO: 92.5 FL (ref 79–97)
MONOCYTES # BLD AUTO: 0.24 10*3/MM3 (ref 0.1–0.9)
MONOCYTES NFR BLD AUTO: 1.3 % (ref 5–12)
NEUTROPHILS NFR BLD AUTO: 15.8 10*3/MM3 (ref 1.7–7)
NEUTROPHILS NFR BLD AUTO: 85.9 % (ref 42.7–76)
NRBC BLD AUTO-RTO: 0 /100 WBC (ref 0–0.2)
PLATELET # BLD AUTO: 207 10*3/MM3 (ref 140–450)
PMV BLD AUTO: 10.3 FL (ref 6–12)
RBC # BLD AUTO: 3.74 10*6/MM3 (ref 3.77–5.28)
WBC NRBC COR # BLD: 18.38 10*3/MM3 (ref 3.4–10.8)

## 2023-09-28 PROCEDURE — 85025 COMPLETE CBC W/AUTO DIFF WBC: CPT | Performed by: OBSTETRICS & GYNECOLOGY

## 2023-09-28 PROCEDURE — C1755 CATHETER, INTRASPINAL: HCPCS

## 2023-09-28 PROCEDURE — 0DQR0ZZ REPAIR ANAL SPHINCTER, OPEN APPROACH: ICD-10-PCS | Performed by: OBSTETRICS & GYNECOLOGY

## 2023-09-28 PROCEDURE — 82948 REAGENT STRIP/BLOOD GLUCOSE: CPT

## 2023-09-28 PROCEDURE — 88307 TISSUE EXAM BY PATHOLOGIST: CPT

## 2023-09-28 RX ORDER — BUPRENORPHINE HYDROCHLORIDE 8 MG/1
8 TABLET SUBLINGUAL 2 TIMES DAILY
COMMUNITY

## 2023-09-28 RX ORDER — BUPRENORPHINE 2 MG/1
8 TABLET SUBLINGUAL 2 TIMES DAILY
Status: DISCONTINUED | OUTPATIENT
Start: 2023-09-28 | End: 2023-09-29 | Stop reason: HOSPADM

## 2023-09-28 RX ADMIN — IBUPROFEN 600 MG: 600 TABLET, FILM COATED ORAL at 13:13

## 2023-09-28 RX ADMIN — METOCLOPRAMIDE 10 MG: 10 TABLET ORAL at 00:56

## 2023-09-28 RX ADMIN — METFORMIN HYDROCHLORIDE 500 MG: 500 TABLET ORAL at 00:56

## 2023-09-28 RX ADMIN — BUPRENORPHINE HCL 8 MG: 2 TABLET SUBLINGUAL at 09:07

## 2023-09-28 RX ADMIN — METFORMIN HYDROCHLORIDE 500 MG: 500 TABLET ORAL at 09:07

## 2023-09-28 RX ADMIN — IBUPROFEN 600 MG: 600 TABLET, FILM COATED ORAL at 00:56

## 2023-09-28 RX ADMIN — DOCUSATE SODIUM 100 MG: 100 CAPSULE, LIQUID FILLED ORAL at 00:56

## 2023-09-28 RX ADMIN — DOCUSATE SODIUM 100 MG: 100 CAPSULE, LIQUID FILLED ORAL at 21:23

## 2023-09-28 RX ADMIN — PRENATAL VIT W/ FE FUMARATE-FA TAB 27-0.8 MG 1 TABLET: 27-0.8 TAB at 09:07

## 2023-09-28 RX ADMIN — METFORMIN HYDROCHLORIDE 500 MG: 500 TABLET ORAL at 18:09

## 2023-09-28 RX ADMIN — BUPRENORPHINE HCL 8 MG: 2 TABLET SUBLINGUAL at 21:23

## 2023-09-28 RX ADMIN — WITCH HAZEL: 500 SOLUTION RECTAL; TOPICAL at 18:20

## 2023-09-28 NOTE — ANESTHESIA PROCEDURE NOTES
Labor Epidural      Patient reassessed immediately prior to procedure    Patient location during procedure: OB  Indication:at surgeon's request  Performed By  CRNA/CAA: Jeevan Leal CRNA  Preanesthetic Checklist  Completed: patient identified, IV checked, site marked, risks and benefits discussed, surgical consent, monitors and equipment checked, pre-op evaluation and timeout performed  Additional Notes  Negative paresthesia / hem / CSF, cathater placed with ease, secured in place. Bolus given and infusion started, pt tolerated well.  Prep:  Pt Position:sitting  Sterile Tech:cap, gloves, mask and sterile barrier  Prep:povidone-iodine 7.5% surgical scrub  Monitoring:blood pressure monitoring and continuous pulse oximetry  Epidural Block Procedure:  Approach:midline  Guidance:landmark technique  Location:L4-L5  Needle Type:Tuohy  Needle Gauge:18 G  Loss of Resistance Medium: air  Loss of Resistance: 7cm  Cath Depth at skin:12 cm  Paresthesia: none  Aspiration:negative  Test Dose:negative  Post Assessment:  Dressing:secured with tape and occlusive dressing applied  Pt Tolerance:patient tolerated the procedure well with no apparent complications  Complications:no

## 2023-09-28 NOTE — H&P
Obstetric History and Physical    Chief Complaint   Patient presents with    Rupture of Membranes     PT PRESENTS TO L/D FOR C/O POSSIBLE ROM AND REGULAR CTX. DENIES VB. REPORTS GOOD FETAL MOVEMENT.       Subjective     Patient is a 32 y.o. female  currently at 36 1/7 weeks who was admitted with c/o leaking fluid and contractions. History of  deliveries, maternal drug use on suboxone and type 2 diabetes.      Her prenatal care is complicated by  substance abuse  narcotics maintenance program and diabetes  GDM A2.  Her previous obstetric/gynecological history is noted for is remarkable for  previous  deliveries.     The following portions of the patients history were reviewed and updated as appropriate: current medications, allergies, past medical history, past surgical history, past family history, past social history, and problem list .       Prenatal Information:   Maternal Prenatal Labs  Blood Type ABO Type   Date Value Ref Range Status   2023 A  Final      Rh Status RH type   Date Value Ref Range Status   2023 Positive  Final      Antibody Screen Antibody Screen   Date Value Ref Range Status   2023 Negative  Final      Rapid Urin Drug Screen Barbiturates Screen, Urine   Date Value Ref Range Status   2023 Negative Negative Final     Benzodiazepine Screen, Urine   Date Value Ref Range Status   2023 Negative Negative Final     Methadone Screen, Urine   Date Value Ref Range Status   2023 Negative Negative Final     Opiate Screen   Date Value Ref Range Status   2023 Negative Negative Final     THC, Screen, Urine   Date Value Ref Range Status   2023 Negative Negative Final     Cocaine Screen, Urine   Date Value Ref Range Status   2023 Negative Negative Final     Amphetamine Screen, Urine   Date Value Ref Range Status   2023 Negative Negative Final     Propoxyphene Screen   Date Value Ref Range Status   2023 Negative Negative Final      Buprenorphine, Screen, Urine   Date Value Ref Range Status   09/27/2023 Positive (A) Negative Final     Methamphetamine, Ur   Date Value Ref Range Status   09/27/2023 Negative Negative Final     Oxycodone Screen, Urine   Date Value Ref Range Status   09/27/2023 Negative Negative Final     Tricyclic Antidepressants Screen   Date Value Ref Range Status   09/27/2023 Negative Negative Final      Group B Strep Culture No results found for: GBSANTIGEN           External Prenatal Results       Pregnancy Outside Results - Transcribed From Office Records - See Scanned Records For Details       Test Value Date Time    ABO  A  10/14/21 1822    Rh  Positive  10/14/21 1822    Antibody Screen  Negative  10/14/21 1822       Negative  10/11/21 0715    Varicella IgG       Rubella ^ Immune  04/08/21     Hgb  9.8 g/dL 10/14/21 1822       9.7 g/dL 10/11/21 0715       11.4 g/dL 04/21/21 0505       12.5 g/dL 02/23/21 0909    Hct  30.7 % 10/14/21 1822       30.2 % 10/11/21 0715       34.4 % 04/21/21 0505       37.6 % 02/23/21 0909    Glucose Fasting GTT       Glucose Tolerance Test 1 hour ^ 114  07/23/21     Glucose Tolerance Test 3 hour       Gonorrhea (discrete) ^ NEG  09/17/21     Chlamydia (discrete) ^ NEG  09/17/21     RPR ^ Non-Reactive  04/08/21     VDRL       Syphilis Antibody       HBsAg ^ Negative  04/08/21     Herpes Simplex Virus PCR       Herpes Simplex VIrus Culture       HIV ^ Negative  04/08/21     Hep C RNA Quant PCR       Hep C Antibody       AFP       Group B Strep ^ NEG  09/16/21     GBS Susceptibility to Clindamycin       GBS Susceptibility to Erythromycin       Fetal Fibronectin       Genetic Testing, Maternal Blood                 Drug Screening       Test Value Date Time    Urine Drug Screen       Amphetamine Screen  Negative  08/26/18 1157    Barbiturate Screen  Negative  08/26/18 1157    Benzodiazepine Screen  Negative  08/26/18 1157    Methadone Screen  Negative  08/26/18 1157    Phencyclidine Screen   Negative  18 1157    Opiates Screen  Negative  18 1157    THC Screen  Negative  18 115    Cocaine Screen       Propoxyphene Screen       Buprenorphine Screen  Negative  18 115    Methamphetamine Screen       Oxycodone Screen  Negative  18 115    Tricyclic Antidepressants Screen                 Legend    ^: Historical                              Past OB History:     OB History    Para Term  AB Living   1 0 0 0 0 0   SAB IAB Ectopic Molar Multiple Live Births   0 0 0 0 0 0      # Outcome Date GA Lbr Matthias/2nd Weight Sex Delivery Anes PTL Lv   1 Current                Past Medical History: Past Medical History:   Diagnosis Date    Substance abuse       Past Surgical History History reviewed. No pertinent surgical history.   Family History: History reviewed. No pertinent family history.   Social History:  has no history on file for tobacco use.   has no history on file for alcohol use.   has no history on file for drug use.        Review of Systems        Objective     Vital Signs Range for the last 24 hours  Temperature: Temp:  [98.7 °F (37.1 °C)] 98.7 °F (37.1 °C)   Temp Source: Temp src: Oral   BP: BP: (143)/(77) 143/77   Pulse: Heart Rate:  [89] 89   Respirations: Resp:  [20] 20   Weight: Weight:  [95.7 kg (211 lb)] 95.7 kg (211 lb)     Physical Examination:     Physical exam:    Constitutional:  Well-developed and well-nourished.  No respiratory distress.      HENT:  Head:  Normocephalic and atraumatic.  Mouth:  Moist mucous membranes.    Eyes:  Conjunctivae and EOM are normal.  No scleral icterus.    Neck:  Neck supple.  No JVD present.    Cardiovascular:  Normal rate, regular rhythm and normal heart sounds with no murmur. No edema.  Pulmonary/Chest:  No respiratory distress, no wheezes, no crackles, with normal breath sounds and good air movement.  Abdominal:  Soft.  FH 38 cm, FHR 130s category 1 tracing  Musculoskeletal:  No edema, no tenderness, and no deformity.   No red or swollen joints anywhere.    Neurological:  Alert and oriented to person, place, and time. No facial droop.  No slurred speech.   Skin:  Skin is warm and dry. No rash noted. No pallor.   Pelvic Exam: normal external female genitalia, v    Presentation: Cephalic    Cervix: Exam by:   Dr Dolan   Dilation:  9   Effacement: 90 % effaced   Station:  + 2 station.      Laboratory Results:   CBC and coagulation:  Results from last 7 days   Lab Units 09/27/23  1938   WBC 10*3/mm3 12.89*   HEMOGLOBIN g/dL 11.1*   HEMATOCRIT % 33.4*   MCV fL 93.6   MCHC g/dL 33.2   PLATELETS 10*3/mm3 189       Renal and electrolytes:  Results from last 7 days   Lab Units 09/27/23  1938   SODIUM mmol/L 136   POTASSIUM mmol/L 3.9   CHLORIDE mmol/L 104   CO2 mmol/L 23.4   BUN mg/dL 4*   CREATININE mg/dL 0.64   CALCIUM mg/dL 9.1   GLUCOSE mg/dL 117*     Estimated Creatinine Clearance: 136.3 mL/min (by C-G formula based on SCr of 0.64 mg/dL).    Liver and pancreatic function:  Results from last 7 days   Lab Units 09/27/23  1938   ALBUMIN g/dL 2.8*   BILIRUBIN mg/dL 0.2   ALK PHOS U/L 104   AST (SGOT) U/L 15   ALT (SGPT) U/L 10     Endocrine function:  No results found for: HGBA1C  Point of care bedside glucose levels:  Results from last 7 days   Lab Units 09/27/23  2314   GLUCOSE mg/dL 211*     No results found for: TSH, FREET4  Cardiac:        Cultures:  Lab Results   Component Value Date    COLORU Yellow 04/03/2023    CLARITYU Clear 04/03/2023    PHUR 8.0 04/03/2023    GLUCOSEU Negative 04/03/2023    KETONESU 40 mg/dL (2+) (A) 04/03/2023    BLOODU Negative 04/03/2023    NITRITEU Negative 04/03/2023    LEUKOCYTESUR Negative 04/03/2023    BILIRUBINUR Negative 04/03/2023    UROBILINOGEN 1.0 E.U./dL 04/03/2023    RBCUA 3-5 (A) 04/03/2023    WBCUA 0-2 04/03/2023    BACTERIA None Seen 04/03/2023     Microbiology Results (last 10 days)       ** No results found for the last 240 hours. **            Lab Results   Component Value Date    HCGQUANT  85,792.00 2023     Pain Management Panel  More data may exist         Latest Ref Rng & Units 2023 4/3/2023   Pain Management Panel   Amphetamine, Urine Qual Negative Negative  Negative    Barbiturates Screen, Urine Negative Negative  Negative    Benzodiazepine Screen, Urine Negative Negative  Negative    Buprenorphine, Screen, Urine Negative Positive  Positive    Cocaine Screen, Urine Negative Negative  Negative    Fentanyl, Urine Negative Negative  -   Methadone Screen , Urine Negative Negative  Negative    Methamphetamine, Ur Negative Negative  Negative        Radiology Review:  No radiology results for the last 30 days.      Assessment & Plan        labor in third trimester with  delivery    Pregnancy        Assessment:  1.  Intrauterine pregnancy at 36 1/7 weeks gestation with reassuring fetal status.    2.  PPROM.  labor, type 2 diabetes, maternal drug abuse.   3.  GBS status: negative.   Plan:  1. Epidural anesthesia. Steroids antibiotics.  delivery.    2. Plan of care has been reviewed with patient and she agreed with the plan of care. 3.  Risks, benefits of treatment plan have been discussed.  4.  All questions have been answered.

## 2023-09-28 NOTE — PROGRESS NOTES
[facility]  Vaginal Delivery Progress Note    Subjective   Subjective  Postpartum Day 1: Vaginal Delivery    The patient feels well.  Her pain is well controlled with  subutex .   She is ambulating well.  Patient describes her bleeding as thin lochia.    Breastfeeding: with difficulty.    Objective     Objective:  Vital signs (most recent): Blood pressure 127/69, pulse 109, temperature 99.7 °F (37.6 °C), temperature source Oral, resp. rate 18, weight 95.7 kg (211 lb), last menstrual period 01/15/2023, SpO2 96 %, currently breastfeeding.   Vital Signs Range for the last 24 hours  Temperature: Temp:  [97 °F (36.1 °C)-99.7 °F (37.6 °C)] 99.7 °F (37.6 °C)   Temp Source: Temp src: Oral   BP: BP: (109-148)/(51-87) 127/69   Pulse: Heart Rate:  [] 109   Respirations: Resp:  [18-20] 18   Weight: Weight:  [95.7 kg (211 lb)] 95.7 kg (211 lb)     Admit Height:       Physical Exam:  General:  no acute distress.  Abdomen: Fundus: appropriate, firm, non tender.  Extremities: normal, atraumatic, no cyanosis, and trace edema.       [unfilled]       Lab Results   Component Value Date    ABO A 2023    RH Positive 2023        Lab Results   Component Value Date    HGB 11.4 (L) 2023    HCT 34.6 2023          Assessment:  Assessment & Plan     labor in third trimester with  delivery    Pregnancy    Postpartum care following vaginal delivery      Alaina Lyons is Day 1 post-partum    Vaginal, Spontaneous     .      Plan:    Continue current care.  Taking Subutex.  Type 2 DM.  States she was diet controlled but blood sugars are elevated.  Metformin started yesterday.  Diet discussed in detail. Diabetic diet. Continue blood glucose checks fasting and post prandial.  Repeat CBC and CMP in AM.      Luly Frazier, TASHI  2023  08:43 EDT

## 2023-09-28 NOTE — L&D DELIVERY NOTE
[facility]   Vaginal Delivery Note    Patient Name: Alaina Lyons  : 1990  MRN: [id number]    Date of Delivery: 2023     Diagnosis     Pre & Post-Delivery:  Intrauterine pregnancy at 36w1d  Labor status: Spontaneous Onset of Labor      labor in third trimester with  delivery    Pregnancy    Postpartum care following vaginal delivery             Problem List    Transfer to Postpartum     Review the Delivery Report for details.     Delivery     Delivery: Vaginal, Spontaneous     YOB: 2025    Time of Birth:  Gestational Age 10:23 PM   36w1d     Anesthesia: Epidural     Delivering clinician: Anatoly Anthony    Forceps?   No   Vacuum? No    Shoulder dystocia present: Yes Shoulder Dystocia Details  Dystocia Present? Yes   Time head delivered:    Gentle attempt at traction, assisted by maternal expulsive forces: Yes   If no, why:    Anterior shoulder:    Time recognized: 2023 10:22 PM     Time help called: 2023 10:22 PM  Called by: MILLY GALLARDO RN    Time provider arrived: 2023  9:44 PM  Provider who arrived: DR. ANTHONY    Time NICU arrived: 2023 10:00 PM  NICU staff: MARVIN DA SILVA RN    Time additional staff arrived: 2023 10:20 PM  Additional staff: BETHEL HERRERA RN           Delivery narrative:  Spontaneous vaginal delivery of a viable female infant, shoulder dystocia corrected with Dennis Maneuver, suprapubic pressure, episiotomy and Bailey's Maneuver.   No extension of episiotomy. 3rd stage normal. Episiotomy repaired with 2.0 chromic and 3.0 vicry suture.      Infant     Findings: female  infant     Infant observations: Weight: 3050 g (6 lb 11.6 oz)   Length: 19.291  in  Observations/Comments:  SEE NBN NOTE      Apgars: 7  @ 1 minute /    8  @ 5 minutes         Placenta & Cord         Placenta delivered  Spontaneous  at   2023 10:27 PM     Cord: 3 vessels  present.   Nuchal Cord?  no   Cord blood obtained: Yes    Cord gases obtained:  No    Cord gas  results: Venous:  No results found for: PHCVEN    Arterial:  No results found for: PHCART     Repair     Episiotomy: Median     Yes  Suture used for repair: 3-0 Vicryl    Lacerations: No   Estimated Blood Loss:       Quantitative Blood Loss:    QBL from VAG DEL: 100 (09/27/23 2330)     Complications     Shoulder dystocia    Disposition     Mother to Mother Baby/Postpartum  in stable condition currently.  Baby to remains with mom  in stable condition currently.    Anatoly Dolan MD  09/28/23  10:45 EDT
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
## 2023-09-28 NOTE — PLAN OF CARE
Goal Outcome Evaluation:  Plan of Care Reviewed With: patient        Progress: improving  Outcome Evaluation: Patient ambulating independently. Fundus firm and at midline. Light lochia rubra noted. Pt voiding spontaneously without difficulty. PRN pain medication administered x1. No acute changes. VSS.

## 2023-09-28 NOTE — PLAN OF CARE
Goal Outcome Evaluation:            Vss. Patient fundus firm and midline. Ambulating independently.              Hemostasis: Electrocautery

## 2023-09-28 NOTE — PLAN OF CARE
Goal Outcome Evaluation:  Plan of Care Reviewed With: patient        Progress: improving  Outcome Evaluation: VSS. . FUNDUS FIRM AND AT MIDLINE. PLAN OF CARE ONGOING.

## 2023-09-28 NOTE — ED NOTES
Called to bedside due to need for peripheral IV access, patient in active labor.  Did multiple attempts by nursing unsuccessful.    I placed 20-gauge ultrasound-guided IV in right brachial vein on first attempt.  Area sterilized prior to start of procedure and sterile dressing placed after procedure.  Visualized IV advancement into the vein under ultrasound guidance.  Confirmed with appropriate blood drawback and flush.     Barbara Rouse MD  09/27/23 2033

## 2023-09-28 NOTE — OUTREACH NOTE
[facility]  IP Postpartum    Questions/Answers      Flowsheet Row Responses   Best Method for Contacting Home   Support Person Present Yes   Does the patient have a car seat at the hospital Yes   Delivery Note Reviewed Reviewed   Were birth expectations met? Yes   Is there a need for additional support/resources? No   Lactation Note Reviewed Other   Note Reviewed Other Comment Not available.   Is additional support needed? No   Any questions or concerns? No   Is the patient going to use Meds to Beds? Yes   Any concerns related discharge meds/ability to  prescriptions? No   OB Discharge Navigator Reviewed  Reviewed   Confirm Postpartum OB appointment No  [Will be made prior to discharge home.]   Confirm initial well-child Pediatrician appointment date/time: No  [Will be made prior to discharge home.]   Additional post-discharge F/U appointments No   Does patient have transportation to appointments? Yes   Any other assistance needed to ensure she is able to attend appointments? No   Does patient have supplies needed at home for  care? Clothing, Crib, Diapers, Formula            Spoke with patient at bedside. Pt states she is doing fairly well following vaginal delivery of her infant daughter last night. Pt reports she has perineal soreness but is tolerating pain well. Pt denies any needs or concerns at this time.    Referral submitted to the following resources (verbal consent received to submit demographic information):         Elvira Sen RN  Maternity Nurse Navigator    2023, 11:51 EDT

## 2023-09-29 VITALS
TEMPERATURE: 97.9 F | HEART RATE: 78 BPM | BODY MASS INDEX: 37.38 KG/M2 | WEIGHT: 211 LBS | SYSTOLIC BLOOD PRESSURE: 110 MMHG | OXYGEN SATURATION: 96 % | RESPIRATION RATE: 20 BRPM | DIASTOLIC BLOOD PRESSURE: 58 MMHG

## 2023-09-29 LAB
ALBUMIN SERPL-MCNC: 2.9 G/DL (ref 3.5–5.2)
ALBUMIN/GLOB SERPL: 0.8 G/DL
ALP SERPL-CCNC: 96 U/L (ref 39–117)
ALT SERPL W P-5'-P-CCNC: 16 U/L (ref 1–33)
ANION GAP SERPL CALCULATED.3IONS-SCNC: 10.9 MMOL/L (ref 5–15)
AST SERPL-CCNC: 25 U/L (ref 1–32)
BASOPHILS # BLD AUTO: 0.07 10*3/MM3 (ref 0–0.2)
BASOPHILS NFR BLD AUTO: 0.4 % (ref 0–1.5)
BILIRUB SERPL-MCNC: 0.2 MG/DL (ref 0–1.2)
BUN SERPL-MCNC: 8 MG/DL (ref 6–20)
BUN/CREAT SERPL: 11.4 (ref 7–25)
CALCIUM SPEC-SCNC: 9.6 MG/DL (ref 8.6–10.5)
CHLORIDE SERPL-SCNC: 103 MMOL/L (ref 98–107)
CO2 SERPL-SCNC: 21.1 MMOL/L (ref 22–29)
CREAT SERPL-MCNC: 0.7 MG/DL (ref 0.57–1)
DEPRECATED RDW RBC AUTO: 49.9 FL (ref 37–54)
EGFRCR SERPLBLD CKD-EPI 2021: 115.8 ML/MIN/1.73
EOSINOPHIL # BLD AUTO: 0.08 10*3/MM3 (ref 0–0.4)
EOSINOPHIL NFR BLD AUTO: 0.4 % (ref 0.3–6.2)
ERYTHROCYTE [DISTWIDTH] IN BLOOD BY AUTOMATED COUNT: 14.2 % (ref 12.3–15.4)
GLOBULIN UR ELPH-MCNC: 3.7 GM/DL
GLUCOSE BLDC GLUCOMTR-MCNC: 192 MG/DL (ref 70–130)
GLUCOSE SERPL-MCNC: 189 MG/DL (ref 65–99)
HCT VFR BLD AUTO: 35.1 % (ref 34–46.6)
HGB BLD-MCNC: 11.2 G/DL (ref 12–15.9)
IMM GRANULOCYTES # BLD AUTO: 0.21 10*3/MM3 (ref 0–0.05)
IMM GRANULOCYTES NFR BLD AUTO: 1.1 % (ref 0–0.5)
LYMPHOCYTES # BLD AUTO: 4.17 10*3/MM3 (ref 0.7–3.1)
LYMPHOCYTES NFR BLD AUTO: 22.8 % (ref 19.6–45.3)
MCH RBC QN AUTO: 30.9 PG (ref 26.6–33)
MCHC RBC AUTO-ENTMCNC: 31.9 G/DL (ref 31.5–35.7)
MCV RBC AUTO: 96.7 FL (ref 79–97)
MONOCYTES # BLD AUTO: 0.94 10*3/MM3 (ref 0.1–0.9)
MONOCYTES NFR BLD AUTO: 5.1 % (ref 5–12)
NEUTROPHILS NFR BLD AUTO: 12.82 10*3/MM3 (ref 1.7–7)
NEUTROPHILS NFR BLD AUTO: 70.2 % (ref 42.7–76)
NRBC BLD AUTO-RTO: 0 /100 WBC (ref 0–0.2)
PLATELET # BLD AUTO: 231 10*3/MM3 (ref 140–450)
PMV BLD AUTO: 10.5 FL (ref 6–12)
POTASSIUM SERPL-SCNC: 4.5 MMOL/L (ref 3.5–5.2)
PROT SERPL-MCNC: 6.6 G/DL (ref 6–8.5)
RBC # BLD AUTO: 3.63 10*6/MM3 (ref 3.77–5.28)
SODIUM SERPL-SCNC: 135 MMOL/L (ref 136–145)
WBC NRBC COR # BLD: 18.29 10*3/MM3 (ref 3.4–10.8)

## 2023-09-29 PROCEDURE — 82948 REAGENT STRIP/BLOOD GLUCOSE: CPT

## 2023-09-29 PROCEDURE — 80053 COMPREHEN METABOLIC PANEL: CPT | Performed by: NURSE PRACTITIONER

## 2023-09-29 PROCEDURE — 85025 COMPLETE CBC W/AUTO DIFF WBC: CPT | Performed by: NURSE PRACTITIONER

## 2023-09-29 RX ORDER — DOCUSATE SODIUM 100 MG/1
100 CAPSULE, LIQUID FILLED ORAL 2 TIMES DAILY PRN
Qty: 40 CAPSULE | Refills: 1 | Status: SHIPPED | OUTPATIENT
Start: 2023-09-29

## 2023-09-29 RX ORDER — IBUPROFEN 600 MG/1
600 TABLET ORAL EVERY 6 HOURS PRN
Qty: 40 TABLET | Refills: 1 | Status: SHIPPED | OUTPATIENT
Start: 2023-09-29

## 2023-09-29 RX ADMIN — DOCUSATE SODIUM 100 MG: 100 CAPSULE, LIQUID FILLED ORAL at 08:52

## 2023-09-29 RX ADMIN — BUPRENORPHINE HCL 8 MG: 2 TABLET SUBLINGUAL at 08:52

## 2023-09-29 RX ADMIN — PRENATAL VIT W/ FE FUMARATE-FA TAB 27-0.8 MG 1 TABLET: 27-0.8 TAB at 08:52

## 2023-09-29 RX ADMIN — BENZOCAINE AND LEVOMENTHOL: 200; 5 SPRAY TOPICAL at 06:08

## 2023-09-29 NOTE — PLAN OF CARE
Goal Outcome Evaluation:            Vss. Ambulating independently. Voiding spontaneously. Fundus firm and midline

## 2023-09-29 NOTE — DISCHARGE SUMMARY
[facility]  Delivery Discharge Summary    Primary OB Clinician:     EDC: Estimated Date of Delivery: 10/24/23    Gestational Age:36w1d    Antepartum complications:  DM2, maternal substance use, PPROM    Date of Delivery: 2023   Time of Delivery: 10:23 PM     Delivered By:  Anatoly Dolan     Delivery Type: Vaginal, Spontaneous      Baby:  female    Apgar:  7  @ 1 minute /   Apgar:  8  @ 5 minutes   Weight:  3050 g (6 lb 11.6 oz)     Anesthesia: Epidural      Intrapartum complications:  Labor and PPROM, DM2    Rh Immune globulin given: not applicable    Subjective   Subjective  Postpartum Day 2: Vaginal Delivery    The patient feels well.  Her pain is well controlled with nonsteroidal anti-inflammatory drugs.   She is ambulating well.  Patient describes her bleeding as thin lochia.    Breastfeeding: without difficulty.    Objective     Objective:  Vital signs (most recent): Blood pressure 110/58, pulse 78, temperature 97.9 °F (36.6 °C), temperature source Oral, resp. rate 20, weight 95.7 kg (211 lb), last menstrual period 01/15/2023, SpO2 96 %, currently breastfeeding.   Vital Signs Range for the last 24 hours  Temperature: Temp:  [97.9 °F (36.6 °C)-98.3 °F (36.8 °C)] 97.9 °F (36.6 °C)   Temp Source: Temp src: Oral   BP: BP: (110-119)/(58-65) 110/58   Pulse: Heart Rate:  [77-78] 78   Respirations: Resp:  [18-20] 20   Weight:       Admit Height:       Physical Exam:  General:  no acute distress.  Abdomen: Fundus: appropriate, firm, non tender  Extremities: normal, atraumatic, no cyanosis, and trace edema.     [unfilled]       Lab Results   Component Value Date    ABO A 2023    RH Positive 2023        Lab Results   Component Value Date    HGB 11.2 (L) 2023    HCT 35.1 2023         Assesment and Plan:       labor in third trimester with  delivery    Pregnancy    Postpartum care following vaginal delivery    Assessment & Plan    Plan:    [facility].  Pt has been given Ozempic to start by her PCP, she was taking Ozempic prior to pregnancy and reports excellent glucose control.     Ozempic use with breastfeeding:no human data available. Infant harm is not expected, no data to assess effects on milk production.     Discharge Date: 9/29/2023; Discharge Time: 09:28 EDT        Freddie Mathews, TASHI  9/29/2023  09:28 EDT

## 2023-09-29 NOTE — PLAN OF CARE
Goal Outcome Evaluation:                      Discharge instructions given and explained. ER warning signs discussed. Patient verbalized understanding.

## 2023-10-02 LAB — REF LAB TEST METHOD: NORMAL

## 2023-10-04 ENCOUNTER — HOSPITAL ENCOUNTER (EMERGENCY)
Facility: HOSPITAL | Age: 33
Discharge: HOME | End: 2023-10-04
Attending: STUDENT IN AN ORGANIZED HEALTH CARE EDUCATION/TRAINING PROGRAM | Admitting: STUDENT IN AN ORGANIZED HEALTH CARE EDUCATION/TRAINING PROGRAM
Payer: COMMERCIAL

## 2023-10-04 VITALS
DIASTOLIC BLOOD PRESSURE: 73 MMHG | BODY MASS INDEX: 30.12 KG/M2 | TEMPERATURE: 98.2 F | HEART RATE: 96 BPM | RESPIRATION RATE: 16 BRPM | HEIGHT: 63 IN | SYSTOLIC BLOOD PRESSURE: 141 MMHG | WEIGHT: 170 LBS | OXYGEN SATURATION: 98 %

## 2023-10-04 DIAGNOSIS — T78.40XA ALLERGIC REACTION TO DRUG, INITIAL ENCOUNTER: Primary | ICD-10-CM

## 2023-10-04 PROCEDURE — 99283 EMERGENCY DEPT VISIT LOW MDM: CPT

## 2023-10-04 PROCEDURE — 96372 THER/PROPH/DIAG INJ SC/IM: CPT

## 2023-10-04 PROCEDURE — 25010000002 DIPHENHYDRAMINE PER 50 MG: Performed by: NURSE PRACTITIONER

## 2023-10-04 PROCEDURE — 25010000002 METHYLPREDNISOLONE PER 125 MG: Performed by: NURSE PRACTITIONER

## 2023-10-04 RX ORDER — FAMOTIDINE 10 MG/ML
20 INJECTION, SOLUTION INTRAVENOUS ONCE
Status: DISCONTINUED | OUTPATIENT
Start: 2023-10-04 | End: 2023-10-04

## 2023-10-04 RX ORDER — DIPHENHYDRAMINE HYDROCHLORIDE 50 MG/ML
50 INJECTION INTRAMUSCULAR; INTRAVENOUS ONCE
Status: COMPLETED | OUTPATIENT
Start: 2023-10-04 | End: 2023-10-04

## 2023-10-04 RX ORDER — METHYLPREDNISOLONE SODIUM SUCCINATE 125 MG/2ML
125 INJECTION, POWDER, LYOPHILIZED, FOR SOLUTION INTRAMUSCULAR; INTRAVENOUS ONCE
Status: COMPLETED | OUTPATIENT
Start: 2023-10-04 | End: 2023-10-04

## 2023-10-04 RX ORDER — FAMOTIDINE 20 MG/1
20 TABLET, FILM COATED ORAL ONCE
Status: COMPLETED | OUTPATIENT
Start: 2023-10-04 | End: 2023-10-04

## 2023-10-04 RX ORDER — METHYLPREDNISOLONE 4 MG/1
TABLET ORAL
Qty: 1 EACH | Refills: 0 | Status: SHIPPED | OUTPATIENT
Start: 2023-10-04

## 2023-10-04 RX ORDER — DIPHENHYDRAMINE HYDROCHLORIDE 50 MG/ML
25 INJECTION INTRAMUSCULAR; INTRAVENOUS ONCE
Status: DISCONTINUED | OUTPATIENT
Start: 2023-10-04 | End: 2023-10-04

## 2023-10-04 RX ORDER — METHYLPREDNISOLONE SODIUM SUCCINATE 125 MG/2ML
125 INJECTION, POWDER, LYOPHILIZED, FOR SOLUTION INTRAMUSCULAR; INTRAVENOUS ONCE
Status: DISCONTINUED | OUTPATIENT
Start: 2023-10-04 | End: 2023-10-04

## 2023-10-04 RX ORDER — SODIUM CHLORIDE 0.9 % (FLUSH) 0.9 %
10 SYRINGE (ML) INJECTION AS NEEDED
Status: DISCONTINUED | OUTPATIENT
Start: 2023-10-04 | End: 2023-10-04

## 2023-10-04 RX ORDER — DIPHENHYDRAMINE HCL 25 MG
50 TABLET ORAL EVERY 6 HOURS PRN
Qty: 30 TABLET | Refills: 0 | Status: SHIPPED | OUTPATIENT
Start: 2023-10-04

## 2023-10-04 RX ADMIN — METHYLPREDNISOLONE SODIUM SUCCINATE 125 MG: 125 INJECTION, POWDER, FOR SOLUTION INTRAMUSCULAR; INTRAVENOUS at 18:43

## 2023-10-04 RX ADMIN — DIPHENHYDRAMINE HYDROCHLORIDE 50 MG: 50 INJECTION INTRAMUSCULAR; INTRAVENOUS at 18:43

## 2023-10-04 RX ADMIN — FAMOTIDINE 20 MG: 20 TABLET, FILM COATED ORAL at 18:43

## 2023-10-04 NOTE — ED PROVIDER NOTES
Subjective   History of Present Illness  Patient is a 33-year-old female presents emergency room today with complaint of allergic reaction.  Patient reports that she has been on Subutex due to being pregnant states that she is recently started back on Suboxone.  Patient reports that after first dose Suboxone she developed a rash became itchy.  Patient reports swelling around eyes as well as swelling around mouth.  Patient reports that symptoms have somewhat subsided but still there.      Review of Systems   Constitutional: Negative.    HENT: Negative.     Eyes: Negative.    Respiratory: Negative.     Cardiovascular: Negative.    Gastrointestinal: Negative.    Endocrine: Negative.    Genitourinary: Negative.    Musculoskeletal: Negative.    Skin: Negative.    Allergic/Immunologic: Negative.    Neurological: Negative.    Hematological: Negative.    Psychiatric/Behavioral: Negative.       Past Medical History:   Diagnosis Date    Diabetes mellitus     Substance abuse        Allergies   Allergen Reactions    Codeine Unknown - Low Severity    Penicillins Swelling       No past surgical history on file.    No family history on file.    Social History     Socioeconomic History    Marital status:            Objective   Physical Exam  Vitals and nursing note reviewed.   Constitutional:       Appearance: She is well-developed.   HENT:      Head: Normocephalic.      Right Ear: External ear normal.      Left Ear: External ear normal.   Eyes:      Conjunctiva/sclera: Conjunctivae normal.      Pupils: Pupils are equal, round, and reactive to light.   Cardiovascular:      Rate and Rhythm: Normal rate and regular rhythm.      Heart sounds: Normal heart sounds.   Pulmonary:      Effort: Pulmonary effort is normal.      Breath sounds: Normal breath sounds.   Abdominal:      General: Bowel sounds are normal.      Palpations: Abdomen is soft.   Musculoskeletal:         General: Normal range of motion.      Cervical back: Normal  range of motion and neck supple.   Skin:     General: Skin is warm and dry.      Capillary Refill: Capillary refill takes less than 2 seconds.      Findings: Rash present.   Neurological:      Mental Status: She is alert and oriented to person, place, and time.   Psychiatric:         Behavior: Behavior normal.         Thought Content: Thought content normal.       Procedures           ED Course                                           Medical Decision Making  Problems Addressed:  Allergic reaction to drug, initial encounter: complicated acute illness or injury    Risk  OTC drugs.  Prescription drug management.        Final diagnoses:   Allergic reaction to drug, initial encounter       ED Disposition  ED Disposition       ED Disposition   Discharge    Condition   Stable    Comment   --               Susana Porter, APRN  [address]  [facility] [id number] [phone]    Schedule an appointment as soon as possible for a visit   For further evaluation         Medication List        New Prescriptions      diphenhydrAMINE 25 MG tablet  Commonly known as: BENADRYL  Take 2 tablets by mouth Every 6 (Six) Hours As Needed for Itching.     methylPREDNISolone 4 MG dose pack  Commonly known as: MEDROL  Take as directed on package instructions.               Where to Get Your Medications        These medications were sent to [facility] - [place] - [address] [place] [address] - [phone]  - [phone] [address], [place] [address]      Phone: [phone]   diphenhydrAMINE 25 MG tablet  methylPREDNISolone 4 MG dose pack            Soy Gibson, APRN  10/04/23 1726

## 2023-10-04 NOTE — DISCHARGE INSTRUCTIONS
Recommend at the prescriber's discretion, that patient be changed to Subutex as she has exhibited an allergic reaction to suboxone

## 2023-10-05 ENCOUNTER — PATIENT OUTREACH (OUTPATIENT)
Dept: LABOR AND DELIVERY | Facility: HOSPITAL | Age: 33
End: 2023-10-05
Payer: COMMERCIAL

## 2023-10-05 ENCOUNTER — PATIENT MESSAGE (OUTPATIENT)
Dept: LABOR AND DELIVERY | Facility: HOSPITAL | Age: 33
End: 2023-10-05
Payer: COMMERCIAL

## 2023-10-05 NOTE — OUTREACH NOTE
[facility]  Postpartum Check-In    Questions/Answers      Flowsheet Row Responses   Visit Setting Telephone   Best Method for Contacting Home   OB Discharge Note Reviewed  Reviewed   OB Discharge Navigator Reviewed  Reviewed   OB Discharge Medications Reviewed  Reviewed    discharged home with mother? Infant in ICU   Comment Infant at [facility]   Current Pain Levels 0-10 0   At Rest Pain Levels 0-10 0   Pain level with activity 0-10 0   Acceptable Pain Level 0-10 2   Pain Location Perineum   Pain Description Sore, Intermittent   How do you treat your pain? Alize bottle   Verbalized Emotional State Acceptance   Family/Support Network Family, Significant Other   Level of Involvement in Care Attentive   Do you feel comfortable in your relationship with your baby? Yes   Have members of your household adjusted to your baby? Other   Comment Infant in NICU   Is the baby's father supportive and/or involved with the baby? Yes   How does your partner feel about the baby? Happy, Involved   Do you feel safe at home, school and work? Yes   Are you in a relationship with someone who threatens you or hurts you? No   Do you have the resources to keep yourself and your baby healthy and safe? Yes   Lochia (per patient report) Similar to Menstrual Flow   Amount Scant   Number of pads per day 3   Lochia Odor Similar to menstrual flow   Is patient breastfeeding? Yes, pumping   Breast Care Supportive Bra, Breast Pads   pumping - Left Breast Soft, Tender   Pumping - Right Breast Tender   Pumping - Left Nipple Intact   Pumping - Right Nipple Intact   Frequency every 2 - 3 hours   Pumping Quantity 120ml   Storage of Milk refrigerator freezer   Postpartum Depression Screening Education Education Provided   Peripheral Blood Glucose Other   Doctor Appointments: Education Provided   Breastfeeding Education Education Provided   Family Planning Education Declined   Postpartum Care Education Education Provided   S & S to  report Education Provided   Followup Appointments Made Yes   Well Child Visit Appointments Made N/A  [Infant remains in NICU]   Appointment Date 10/20/23   Provider/Agency Dr Dolan, [facility]   Peripheral Blood Glucose Education Referred NA            Review of Systems   Constitutional: Negative.    HENT: Negative.     Eyes: Negative.    Respiratory: Negative.     Cardiovascular: Negative.    Gastrointestinal: Negative.    Endocrine: Negative.    Genitourinary:  Positive for vaginal bleeding.   Musculoskeletal: Negative.    Skin: Negative.    Allergic/Immunologic: Negative.    Neurological: Negative.    Hematological: Negative.    Psychiatric/Behavioral: Negative.       Most Recent Geronimo  Depression Scale Score (EPDS)    Performed by a clinician: 1 (2023  6:22 PM)    Received via [employer] questionnaire:  ()     Spoke with patient over the phone. Pt reports she is doing well following vaginal delivery on 2023. She states she has minimal PP pain and bleeding. Her infant daughter remains in NICU at [facility] and patient is pumping breasts and storing milk to bring to hospital for infant use. Pt denies any current needs or concerns.    Referral submitted to the following resources (verbal consent received to submit demographic information):         Elvira Sen RN  Maternity Nurse Navigator    10/5/2023, 14:26 EDT

## 2023-10-09 ENCOUNTER — PATIENT OUTREACH (OUTPATIENT)
Dept: LABOR AND DELIVERY | Facility: HOSPITAL | Age: 33
End: 2023-10-09
Payer: COMMERCIAL

## 2023-10-09 NOTE — OUTREACH NOTE
Pt  had called office and left message over the weekend. Patient's call was returned and phone number for Lactation Nurse was supplied to her. No other needs or concerns voiced at this time.

## 2023-10-12 ENCOUNTER — PATIENT OUTREACH (OUTPATIENT)
Dept: CALL CENTER | Facility: HOSPITAL | Age: 33
End: 2023-10-12
Payer: COMMERCIAL

## 2023-10-12 NOTE — OUTREACH NOTE
[facility] Survey      Flowsheet Row Responses   [facility] patient discharged from? [facility]   Week 1 attempt successful? Yes   Call start time 1444   Call end time 1454   Baby sex Girl    discharged home with mother? Infant in ICU   Baby sex Girl   Delivery type Vaginal   Emotional state Acceptance   Family support Yes   Do you have all necessary resources to care for you and your baby?  Yes   Have members of your household adjusted to your baby? Yes   Additional comments Visits NICU every day, infant to be d/c'd to home 10/13/23, is in 24 hr rooming period prior to d/c   Did you have any problems with pre-eclampsia during this pregnancy? No   Did you have blood glucose issues during this pregnancy No   Lochia amount None   Did you have an episiotomy/tear/abdominal incision? Yes   Feeding Method Breast   Pumping Yes  [pumping for infant in NICU]   Storage of Milk refrigerator freezer   Breast Condition No   Nipple Condition No   Nursing Interventions Lactation education provided   Mom appointment comments: pp f/u appt scheduled   Feeding method comment encouraged skin to skin contact to promote breastfeeding in NICU   Call completed? Yes   How satisfied were you with the Motherhood Connection Program? 5              Nicolette COUCH - Registered Nurse

## 2024-03-06 ENCOUNTER — TRANSCRIBE ORDERS (OUTPATIENT)
Dept: ADMINISTRATIVE | Facility: HOSPITAL | Age: 34
End: 2024-03-06
Payer: COMMERCIAL

## 2024-03-06 DIAGNOSIS — R10.11 RIGHT UPPER QUADRANT PAIN: Primary | ICD-10-CM

## 2024-11-18 NOTE — ANESTHESIA PREPROCEDURE EVALUATION
Anesthesia Evaluation     Patient summary reviewed and Nursing notes reviewed   NPO Solid Status: N/A  NPO Liquid Status: N/A           Airway   Mallampati: I  TM distance: <3 FB  Neck ROM: full  no difficulty expected  Dental - normal exam     Pulmonary - negative pulmonary ROS and normal exam   Cardiovascular - negative cardio ROS and normal exam  Exercise tolerance: excellent (>7 METS)    NYHA Classification: I        Neuro/Psych- negative ROS  GI/Hepatic/Renal/Endo - negative ROS     Musculoskeletal (-) negative ROS    Abdominal  - normal exam   Substance History - negative use     OB/GYN    (+) Pregnant        Other - negative ROS                     Anesthesia Plan    ASA 1     epidural       Anesthetic plan, risks, benefits, and alternatives have been provided, discussed and informed consent has been obtained with: patient.    CODE STATUS:    Level Of Support Discussed With: Patient  Code Status (Patient has no pulse and is not breathing): CPR (Attempt to Resuscitate)  Medical Interventions (Patient has pulse or is breathing): Full Support       RCU team